# Patient Record
Sex: FEMALE | Race: WHITE | NOT HISPANIC OR LATINO | Employment: UNEMPLOYED | URBAN - NONMETROPOLITAN AREA
[De-identification: names, ages, dates, MRNs, and addresses within clinical notes are randomized per-mention and may not be internally consistent; named-entity substitution may affect disease eponyms.]

---

## 2018-09-26 ENCOUNTER — OFFICE VISIT CONVERTED (OUTPATIENT)
Dept: FAMILY MEDICINE CLINIC | Age: 23
End: 2018-09-26
Attending: NURSE PRACTITIONER

## 2020-03-23 ENCOUNTER — OFFICE VISIT CONVERTED (OUTPATIENT)
Dept: FAMILY MEDICINE CLINIC | Age: 25
End: 2020-03-23
Attending: FAMILY MEDICINE

## 2020-03-23 ENCOUNTER — HOSPITAL ENCOUNTER (OUTPATIENT)
Dept: OTHER | Facility: HOSPITAL | Age: 25
Discharge: HOME | End: 2020-03-23
Attending: FAMILY MEDICINE

## 2020-03-25 LAB — BACTERIA SPEC AEROBE CULT: NORMAL

## 2021-03-09 ENCOUNTER — HOSPITAL ENCOUNTER (OUTPATIENT)
Dept: OTHER | Facility: HOSPITAL | Age: 26
Discharge: HOME | End: 2021-03-09
Attending: NURSE PRACTITIONER

## 2021-03-09 ENCOUNTER — OFFICE VISIT CONVERTED (OUTPATIENT)
Dept: FAMILY MEDICINE CLINIC | Age: 26
End: 2021-03-09
Attending: NURSE PRACTITIONER

## 2021-05-18 NOTE — PROGRESS NOTES
Sylwia Toney 1995     Office/Outpatient Visit    Visit Date: Wed, Sep 26, 2018 03:20 pm    Provider: Peyton Zambrano N.P. (Assistant: Roxane Luna MA)    Location: [facility]        Electronically signed by Peyton Zambrano N.P. on  09/26/2018 03:44:28 PM                             SUBJECTIVE:        CC:     Yahaira is a 23 year old White female.  patient presents today with complaints of not being able to focus in school.;         HPI: Yahaira presents with c/o trouble focusing in school. She is going to [employer] for [employer] and working at [employer] as well. She notes that she has failed 3 of her classes. Not getting much sleep but denies trouble sleeping. She has history of ADD. Was previously on Concerta and then Focalin. Has not taken for about 7 years.     ROS:     CONSTITUTIONAL:  Negative for chills and fever.      CARDIOVASCULAR:  Negative for chest pain and palpitations.      RESPIRATORY:  Negative for dyspnea and frequent wheezing.      GASTROINTESTINAL:  Negative for abdominal pain and vomiting.      NEUROLOGICAL:  Negative for dizziness and headaches.      ENDOCRINE:  Negative for polydipsia and polyphagia.      PSYCHIATRIC:  Positive for difficulty concentrating.   Negative for suicidal thoughts.          PMH/FMH/SH:     Last Reviewed on 10/11/2017 03:33 PM by Peyton Zambrano    Past Medical History:                 PAST MEDICAL HISTORY         Chicken pox         GYNECOLOGICAL HISTORY:    G1 miscarriage 1--sept 2013    Menarche occurred at age 11.  Hospitalizations: ([facility] 2/2011)         PREVENTIVE HEALTH MAINTENANCE             PAP SMEAR: was last done 2016 with normal results [facility] [place]         Surgical History:         Breast Augmentation - 2013.       Pressure Equalization Tubes: at age 1;         Family History:         Mother: Diabetes         Social History:     Occupation: [employer]      Marital Status: Single         Tobacco/Alcohol/Supplements:     Last  Reviewed on 10/11/2017 03:33 PM by Pyeton Zambrano    Tobacco: Current Smoker: Currently smokes cigarettes some days.          Alcohol: Frequency:    on rare occasion;         Substance Abuse History:     Last Reviewed on 10/11/2017 03:33 PM by Peyton Zambrano        has tried lsd, mushrooms, ectasy,marijuana         Mental Health History:     Last Reviewed on 10/11/2017 03:33 PM by Peyton Zambrano        Communicable Diseases (eg STDs):     Last Reviewed on 10/11/2017 03:33 PM by Peyton Zambrano            Current Problems:     Last Reviewed on 10/11/2017 03:33 PM by Peyton Zambrano    Constipation     Ovarian cyst     Acne     Chondromalacia patellae     ADD     Diffuse arthralgia     Depression     Other drug abuse, episodic         Immunizations:     DTaP 1993     DTaP 3/2/1994     DTaP 6/21/1994     DTaP 1/13/1995     DTaP 9/19/1997     Td adult 9/20/2004     HIB-Mency 1993     HIB-Mency 3/2/1994     HIB-Mency 6/21/1994     HIB-Mency 1/13/1995     Hep B (pedi/adol, 3-dose schedule) 1993     Hep B (pedi/adol, 3-dose schedule) 1993     Hep B (pedi/adol, 3-dose schedule) 10/28/1994     Menveo 2/22/2012     Pfizer MenB vs. Havrix 5/3/2013     Pfizer MenB vs. Havrix 7/17/2013     Positive Hx. of Chicken Pox 5/7/2007     zzGardasil 9/28/2007     zzGardasil 12/12/2007     zzGardasil 3/14/2008     OPV  Poliovirus, live (oral) 1993     OPV  Poliovirus, live (oral) 3/2/1994     OPV  Poliovirus, live (oral) 6/21/1994     OPV  Poliovirus, live (oral) 9/19/1997     MMR  (Measles-Mumps-Rubella), live 1/13/1995     MMR  (Measles-Mumps-Rubella), live 9/19/1997     FluMist 8/31/2009     FluMist 9/28/2010     FluMist 11/4/2011     FluMist 1/11/2013     Menactra (Meningococcal MCV4P) 8/21/2007     Tdap (Tetanus, reduced diph, acellular pertussis) 12/10/2010         Allergies:     Last Reviewed on 10/11/2017 03:33 PM by Peyton Zambrano      No Known Drug Allergies.         Current Medications:     Last Reviewed on  10/11/2017 03:33 PM by Peyton Zambrano    None        OBJECTIVE:        Vitals:         Current: 9/26/2018 3:24:57 PM    Ht:  5 ft, 5 in;  Wt: 150.2 lbs;  BMI: 25.0    T: 97.9 F (temporal);  BP: 120/89 mm Hg (right arm, sitting);  P: 75 bpm (right arm (BP Cuff), sitting)        Exams:     PHYSICAL EXAM:     GENERAL: well developed, well nourished;  no apparent distress;     RESPIRATORY: normal respiratory rate and pattern with no distress; normal breath sounds with no rales, rhonchi, wheezes or rubs;     CARDIOVASCULAR: normal rate; rhythm is regular;     GASTROINTESTINAL: nontender; normal bowel sounds; no organomegaly;     MUSCULOSKELETAL: normal gait;     NEUROLOGIC: mental status: alert and oriented x 3; GROSSLY INTACT     PSYCHIATRIC: appropriate affect and demeanor; normal psychomotor function; normal speech pattern; normal thought and perception;         ASSESSMENT           799.51   R41.840  Attention or concentration deficit              DDx:         ORDERS:         Procedures Ordered:       REFER  Referral to Specialist or Other Facility  (Send-Out)                   PLAN:          Attention or concentration deficit         REFERRALS[facility]  [facility]  [facility] of concentration deficit with history of ADD.     FOLLOW-UP: Schedule follow-up appointments on a p.r.n. basis. for Annual Checkup           Orders:       REFER  Referral to Specialist or Other Facility  (Send-Out)               Patient Recommendations:        For  Attention or concentration deficit:     I also recommend [facility].  Schedule follow-up appointments as needed.              CHARGE CAPTURE           **Please note: ICD descriptions below are intended for billing purposes only and may not represent clinical diagnoses**        Primary Diagnosis:         799.51 Attention or concentration deficit            R41.840    Attention and concentration deficit              Orders:          99676   Office/outpatient visit; established  patient, level 3  (In-House)

## 2021-05-18 NOTE — PROGRESS NOTES
Feng Sylwia Paige  1995     Office/Outpatient Visit    Visit Date: Mon, Mar 23, 2020 02:38 pm    Provider: Nilay Knapp MD (Assistant: Spurling, Sarah C, MA)    Location: [facility]        Electronically signed by Nilay Knapp MD on  03/23/2020 06:27:03 PM                             Subjective:        CC: Yahaira is a 24 year old White female.  This is her first visit to the clinic.  sore throat;         HPI:           Patient complains of acute upper respiratory infection, unspecified.  These have been present for the past 4 days.  The symptoms include nasal congestion, sinus pain/pressure and sore throat.  Lots of body aches and really, really tired all the time.  No cough. Has not checked temp, and wasn't for sure if had fever or not - but body aches.She denies exposure to ill contacts.  Works for [employer] from home.      Has hx of smoking and just quit last week, so I commended her on that decision.  Counseled her on methods that can be helpful.  Encouraged early reward.     ROS:     CONSTITUTIONAL:  Negative for chills, fatigue, fever, and weight change.      EYES:  Negative for blurred vision.      CARDIOVASCULAR:  Negative for chest pain, orthopnea, paroxysmal nocturnal dyspnea and pedal edema.      RESPIRATORY:  Negative for dyspnea.      GASTROINTESTINAL:  Negative for abdominal pain, constipation, diarrhea, nausea and vomiting.      GENITOURINARY:  Negative for dysuria and frequent urination.      NEUROLOGICAL:  Negative for dizziness, headaches, paresthesias, and weakness.      PSYCHIATRIC:  Negative for anxiety, depression, and sleep disturbances.          Past Medical History / Family History / Social History:         Last Reviewed on 10/11/2017 03:33 PM by Peyton Zambrano    Past Medical History:                 PAST MEDICAL HISTORY         Chicken pox         GYNECOLOGICAL HISTORY:    G1 miscarriage 1--sept 2013    Menarche occurred at age 11.  [facility]         PREVENTIVE HEALTH MAINTENANCE             PAP SMEAR: was last done 10/2017 with normal results [facility]         Surgical History:         Breast Augmentation - 2013.      Pressure Equalization Tubes: at age 1;         Family History:         Mother: Diabetes         Social History:     Occupation: [employer]     Marital Status: Single         Tobacco/Alcohol/Supplements:     Last Reviewed on 9/26/2018 03:22 PM by Roxane Luna    Tobacco: Current Smoker: Currently smokes cigarettes some days.          Alcohol: Frequency:    on rare occasion;         Substance Abuse History:     Last Reviewed on 10/11/2017 03:33 PM by Peyton Zambrano        has tried lsd, mushrooms, ectasy,marijuana         Mental Health History:     Last Reviewed on 10/11/2017 03:33 PM by Peyton Zambrano        Communicable Diseases (eg STDs):     Last Reviewed on 10/11/2017 03:33 PM by Peyton Zambrano        Allergies:     Last Reviewed on 9/26/2018 03:22 PM by Roxane Luna    No Known Allergies.        Current Medications:     Last Reviewed on 3/23/2020 02:38 PM by Spurling, Sarah C    Ponstel 250mg Capsules [PRN]        Objective:        Vitals:         Current: 3/23/2020 2:41:08 PM    Ht:  5 ft, 5 in;  Wt: 164.8 lbs;  BMI: 27.4T: 98.1 F (temporal);  BP: 124/81 mm Hg (left arm, sitting);  P: 92 bpm (left arm (BP Cuff), sitting)O2 Sat: 99 % (room air)        Exams:     PHYSICAL EXAM:     GENERAL: well developed, well nourished;  well groomed;  no apparent distress;     EYES: nonicteric;     E/N/T: EARS:  normal external auditory canals and tympanic membranes;  grossly normal hearing; OROPHARYNX:  normal mucosa, dentition, gingiva, and posterior pharynx;     NECK: thyroid exam reveals no discrete nodules;  carotid exam reveals no bruits;     RESPIRATORY: normal appearance and symmetric expansion of chest wall; normal respiratory rate and pattern with no distress; normal breath sounds with no rales, rhonchi, wheezes or rubs;      CARDIOVASCULAR: normal rate; rhythm is regular;  no systolic murmur;     LYMPHATIC: no enlargement of cervical or facial nodes; no supraclavicular nodes;     NEUROLOGIC: GROSSLY INTACT     PSYCHIATRIC:  appropriate affect and demeanor; normal speech pattern; grossly normal memory;         Lab/Test Results:         Rapid Strep Screen: Negative (03/23/2020),     Performed by:: toyin (03/23/2020),             Assessment:         J06.9   Acute upper respiratory infection, unspecified       F17.210   Nicotine dependence, cigarettes, uncomplicated           ORDERS:         Lab Orders:       [id number]  Group A Streptococcus detection by immunoassay with direct optical observation  (In-House)            46702  [facility] Throat culture, strep  (Send-Out)              Other Orders:         Smoking and Tobacco Cessation 3 to 10 minutes  (In-House)                      Plan:         Acute upper respiratory infection, unspecifiedTreat symptomatically.  Call if symptoms worsen.  If severe soa go to er.           Orders:       [id number]  Group A Streptococcus detection by immunoassay with direct optical observation  (In-House)            52741  [facility] Throat culture, strep  (Send-Out)              Nicotine dependence, cigarettes, uncomplicatedcounseled x 4 min          Orders:         Smoking and Tobacco Cessation 3 to 10 minutes  (In-House)                  Charge Capture:         Primary Diagnosis:     J06.9  Acute upper respiratory infection, unspecified           Orders:      50322  Office/outpatient visit; established patient, level 3  (In-House)            28803  Group A Streptococcus detection by immunoassay with direct optical observation  (In-House)              F17.210  Nicotine dependence, cigarettes, uncomplicated           Orders:        Smoking and Tobacco Cessation 3 to 10 minutes  (In-House)

## 2021-05-18 NOTE — PROGRESS NOTES
Toney Sylwiajono Syed  1995     Office/Outpatient Visit    Visit Date: Tue, Mar 9, 2021 03:54 pm    Provider: Virginia Fermin N.P. (Assistant: Amelia Kaplan MA)    Location: [facility]        Electronically signed by Virginia Fermin N.P. on  03/11/2021 12:54:51 PM                             Subjective:        CC: Yahaira is a 25 year old White female.  right/left hip pain;         HPI: lmp last week          Yahaira presents with pain in unspecified hip.      Yahaira complains of bilateral hip pain.  The location of the pain is superficial and lateral.  It does not radiate.  She describes it as mild, constant, and burning.  The initial onset of pain was more than 6 months ago.  There was no apparent precipitating event or injury.  She has not found anything that relieves the pain.  Nothing in particular aggravates the pain.  She denies recent symptoms of fever, chills, a rash.  Pertinent medical history is unremarkable.            Dysmenorrhea, unspecified details; stable with prn use of pnstel.  requests refills.  denies side effects.      ROS:     CONSTITUTIONAL:  Negative for chills, fatigue, fever, and weight change.      CARDIOVASCULAR:  Negative for chest pain, palpitations, tachycardia, orthopnea, and edema.      RESPIRATORY:  Negative for cough, dyspnea, and hemoptysis.      GASTROINTESTINAL:  Negative for abdominal pain, heartburn, constipation, diarrhea, and stool changes.      GENITOURINARY:  Positive for dysmenorrhea (stable).   Negative for irregular menstrual cycle.      MUSCULOSKELETAL:  Positive for arthralgias (bilateral hips).      NEUROLOGICAL:  Negative for dizziness, headaches, paresthesias, and weakness.      PSYCHIATRIC:  Negative for anxiety, depression, and sleep disturbances.          Past Medical History / Family History / Social History:         Last Reviewed on 3/09/2021 04:13 PM by Virginia Fermin    Past Medical History:                 PAST MEDICAL HISTORY          Chicken pox         GYNECOLOGICAL HISTORY:    G1 miscarriage 1--sept 2013    Menarche occurred at age 11.  Hospitalizations: ([facility])         PREVENTIVE HEALTH MAINTENANCE             COLORECTAL CANCER SCREENING: Up to date (colonoscopy q10y; sigmoidoscopy q5y; Cologuard q3y) was last done 7/15/2020, Results are in chart; colonoscopy with the following abnormalities noted-- hemorrhoids     PAP SMEAR: was last done 2020 with normal results [facility]         Surgical History:         Breast Augmentation - 2013.      Pressure Equalization Tubes: at age 1;         Family History:         Mother: Diabetes         Social History:     Occupation: [employer]     Marital Status: Single         Tobacco/Alcohol/Supplements:     Last Reviewed on 3/09/2021 03:56 PM by Amelia Kaplan    Tobacco: She has a past history of cigarette smoking; quit date:  1 year ago.          Alcohol: Frequency:    on rare occasion;         Substance Abuse History:     Last Reviewed on 10/11/2017 03:33 PM by Peyton Zambrano        has tried lsd, mushrooms, ectasy,marijuana         Mental Health History:     Last Reviewed on 10/11/2017 03:33 PM by Peyton Zambrano        Communicable Diseases (eg STDs):     Last Reviewed on 10/11/2017 03:33 PM by Peyton Zambrano        Current Problems:     Last Reviewed on 3/09/2021 04:13 PM by Virginia Fermin    Attention and concentration deficit    Nicotine dependence, cigarettes, uncomplicated    Pain in unspecified hip    Dysmenorrhea, unspecified        Immunizations:     DTaP 1993    DTaP 3/2/1994    DTaP 6/21/1994    DTaP 1/13/1995    DTaP 9/19/1997    Td adult 9/20/2004    HIB-Mency 1993    HIB-Mency 3/2/1994    HIB-Mency 6/21/1994    HIB-Mency 1/13/1995    Hep B (pedi/adol, 3-dose schedule) 1993    Hep B (pedi/adol, 3-dose schedule) 1993    Hep B (pedi/adol, 3-dose schedule) 10/28/1994    Menveo 2/22/2012    Pfizer MenB vs. Havrix 5/3/2013    Pfizer MenB vs. Havrix  7/17/2013    Positive Hx. of Chicken Pox 5/7/2007    zzGardasil 9/28/2007    zzGardasil 12/12/2007    zzGardasil 3/14/2008    OPV  Poliovirus, live (oral) 1993    OPV  Poliovirus, live (oral) 3/2/1994    OPV  Poliovirus, live (oral) 6/21/1994    OPV  Poliovirus, live (oral) 9/19/1997    MMR  (Measles-Mumps-Rubella), live 1/13/1995    MMR  (Measles-Mumps-Rubella), live 9/19/1997    FluMist 8/31/2009    FluMist 9/28/2010    FluMist 11/4/2011    FluMist 1/11/2013    Menactra (Meningococcal MCV4P) 8/21/2007    Tdap (Tetanus, reduced diph, acellular pertussis) 12/10/2010        Allergies:     Last Reviewed on 3/09/2021 03:57 PM by Amelia Kaplan    No Known Allergies.        Current Medications:     Last Reviewed on 3/09/2021 03:57 PM by Amelia Kaplan    Ponstel 250mg Capsules [PRN]    ADAPAL/ALECIA P GEL 0.1-2.5% Grams [APPLY SMALL PEA-SIZED AMOUNT TO FACE EACH NIGHT AT BEDTIME FOR ACNE.]    SPIRONOLACT 50MG Tablets [TAKE 1 TABLET BY MOUTH EVERY DAY]        Objective:        Vitals:         Current: 3/9/2021 3:59:13 PM    Ht:  5 ft, 5 in;  Wt: 174 lbs;  BMI: 29.0T: 97.3 F (temporal);  BP: 121/81 mm Hg (left arm, sitting);  P: 98 bpm (left arm (BP Cuff), sitting)        Exams:     PHYSICAL EXAM:     GENERAL: no apparent distress;     RESPIRATORY: normal respiratory rate and pattern with no distress; normal breath sounds with no rales, rhonchi, wheezes or rubs;     CARDIOVASCULAR: normal rate; rhythm is regular;     MUSCULOSKELETAL:  Normal range of motion, strength and tone;     NEUROLOGIC: mental status: alert and oriented x 3; GROSSLY INTACT     PSYCHIATRIC:  appropriate affect and demeanor; normal speech pattern; grossly normal memory;         Assessment:         M25.559   Pain in unspecified hip       N94.6   Dysmenorrhea, unspecified           ORDERS:         Meds Prescribed:       [Refilled] Ponstel 250 mg oral capsule [tid prn for up to 7 days take with food], #30 (thirty) capsules, Refills: 1 (one)          Radiology/Test Orders:       [id number]  Bilateral hip x-ray, minimum of two views of each hip, including anteroposterior view of pelvis  (Send-Out)                      Plan:         Pain in unspecified hip        RADIOLOGY:  I have ordered a bilateral hip x-ray to be done today.  MIPS Vaccines Flu and Pneumonia updated in Shot record     RECOMMENDATIONS given include: RICE therapy and she denies any other joint pain.  consider PT, arthritis profile, or referral to ortho if symptoms persist.  xray pending.  exam is normal today..            Orders:       [id number]  Bilateral hip x-ray, minimum of two views of each hip, including anteroposterior view of pelvis  (Send-Out)              Dysmenorrhea, unspecified          Prescriptions:       [Refilled] Ponstel 250 mg oral capsule [tid prn for up to 7 days take with food], #30 (thirty) capsules, Refills: 1 (one)             Patient Recommendations:        For  Pain in unspecified hip:    Rest the affected area and keep it elevated as much as possible. Apply ice over the affected area. Use a compression wrap, such as an Ace bandage.              Charge Capture:         Primary Diagnosis:     M25.559  Pain in unspecified hip           Orders:      [id number]  Office/outpatient visit; established patient, level 3  (In-House)              N94.6  Dysmenorrhea, unspecified

## 2021-07-01 VITALS
WEIGHT: 150.2 LBS | BODY MASS INDEX: 25.02 KG/M2 | TEMPERATURE: 97.9 F | HEIGHT: 65 IN | HEART RATE: 75 BPM | DIASTOLIC BLOOD PRESSURE: 89 MMHG | SYSTOLIC BLOOD PRESSURE: 120 MMHG

## 2021-07-02 VITALS
BODY MASS INDEX: 28.99 KG/M2 | HEIGHT: 65 IN | TEMPERATURE: 97.3 F | DIASTOLIC BLOOD PRESSURE: 81 MMHG | SYSTOLIC BLOOD PRESSURE: 121 MMHG | HEART RATE: 98 BPM | WEIGHT: 174 LBS

## 2021-07-02 VITALS
TEMPERATURE: 98.1 F | DIASTOLIC BLOOD PRESSURE: 81 MMHG | HEIGHT: 65 IN | HEART RATE: 92 BPM | OXYGEN SATURATION: 99 % | BODY MASS INDEX: 27.46 KG/M2 | WEIGHT: 164.8 LBS | SYSTOLIC BLOOD PRESSURE: 124 MMHG

## 2022-06-22 ENCOUNTER — PRE-ADMISSION TESTING (OUTPATIENT)
Dept: PREADMISSION TESTING | Facility: HOSPITAL | Age: 27
End: 2022-06-22

## 2022-06-22 LAB
ANION GAP SERPL CALCULATED.3IONS-SCNC: 11 MMOL/L (ref 5–15)
BASOPHILS # BLD AUTO: 0.07 10*3/MM3 (ref 0–0.2)
BASOPHILS NFR BLD AUTO: 0.6 % (ref 0–1.5)
BUN SERPL-MCNC: 11 MG/DL (ref 6–20)
BUN/CREAT SERPL: 15.7 (ref 7–25)
CALCIUM SPEC-SCNC: 9.4 MG/DL (ref 8.6–10.5)
CHLORIDE SERPL-SCNC: 101 MMOL/L (ref 98–107)
CO2 SERPL-SCNC: 25 MMOL/L (ref 22–29)
CREAT SERPL-MCNC: 0.7 MG/DL (ref 0.57–1)
DEPRECATED RDW RBC AUTO: 39.3 FL (ref 37–54)
EGFRCR SERPLBLD CKD-EPI 2021: 121 ML/MIN/1.73
EOSINOPHIL # BLD AUTO: 0.68 10*3/MM3 (ref 0–0.4)
EOSINOPHIL NFR BLD AUTO: 5.5 % (ref 0.3–6.2)
ERYTHROCYTE [DISTWIDTH] IN BLOOD BY AUTOMATED COUNT: 11.9 % (ref 12.3–15.4)
GLUCOSE SERPL-MCNC: 99 MG/DL (ref 65–99)
HCG SERPL QL: NEGATIVE
HCT VFR BLD AUTO: 38.6 % (ref 34–46.6)
HGB BLD-MCNC: 13.5 G/DL (ref 12–15.9)
IMM GRANULOCYTES # BLD AUTO: 0.03 10*3/MM3 (ref 0–0.05)
IMM GRANULOCYTES NFR BLD AUTO: 0.2 % (ref 0–0.5)
LYMPHOCYTES # BLD AUTO: 2.8 10*3/MM3 (ref 0.7–3.1)
LYMPHOCYTES NFR BLD AUTO: 22.5 % (ref 19.6–45.3)
MCH RBC QN AUTO: 32.3 PG (ref 26.6–33)
MCHC RBC AUTO-ENTMCNC: 35 G/DL (ref 31.5–35.7)
MCV RBC AUTO: 92.3 FL (ref 79–97)
MONOCYTES # BLD AUTO: 0.77 10*3/MM3 (ref 0.1–0.9)
MONOCYTES NFR BLD AUTO: 6.2 % (ref 5–12)
NEUTROPHILS NFR BLD AUTO: 65 % (ref 42.7–76)
NEUTROPHILS NFR BLD AUTO: 8.12 10*3/MM3 (ref 1.7–7)
NRBC BLD AUTO-RTO: 0 /100 WBC (ref 0–0.2)
PLATELET # BLD AUTO: 267 10*3/MM3 (ref 140–450)
PMV BLD AUTO: 10.8 FL (ref 6–12)
POTASSIUM SERPL-SCNC: 3.9 MMOL/L (ref 3.5–5.2)
RBC # BLD AUTO: 4.18 10*6/MM3 (ref 3.77–5.28)
SODIUM SERPL-SCNC: 137 MMOL/L (ref 136–145)
WBC NRBC COR # BLD: 12.47 10*3/MM3 (ref 3.4–10.8)

## 2022-06-22 PROCEDURE — 85025 COMPLETE CBC W/AUTO DIFF WBC: CPT

## 2022-06-22 PROCEDURE — 80048 BASIC METABOLIC PNL TOTAL CA: CPT

## 2022-06-22 PROCEDURE — 36415 COLL VENOUS BLD VENIPUNCTURE: CPT

## 2022-06-22 PROCEDURE — 84703 CHORIONIC GONADOTROPIN ASSAY: CPT

## 2022-06-22 RX ORDER — MEFENAMIC ACID 250 MG/1
2 CAPSULE ORAL TAKE AS DIRECTED
COMMUNITY

## 2022-06-22 NOTE — DISCHARGE INSTRUCTIONS
Take the following medications the morning of surgery:      NONE    ARRIVE AT 11:30    If you are on prescription narcotic pain medication to control your pain you may also take that medication the morning of surgery.    General Instructions:  Do not eat solid food after midnight the night before surgery.  You may drink clear liquids day of surgery but must stop at least one hour before your hospital arrival time.  It is beneficial for you to have a clear drink that contains carbohydrates the day of surgery.  We suggest a 12 to 20 ounce bottle of Gatorade or Powerade for non-diabetic patients or a 12 to 20 ounce bottle of G2 or Powerade Zero for diabetic patients. (Pediatric patients, are not advised to drink a 12 to 20 ounce carbohydrate drink)    Clear liquids are liquids you can see through.  Nothing red in color.     Plain water                               Sports drinks  Sodas                                   Gelatin (Jell-O)  Fruit juices without pulp such as white grape juice and apple juice  Popsicles that contain no fruit or yogurt  Tea or coffee (no cream or milk added)  Gatorade / Powerade  G2 / Powerade Zero    Patients who avoid smoking, chewing tobacco and alcohol for 4 weeks prior to surgery have a reduced risk of post-operative complications.  Quit smoking as many days before surgery as you can.  Do not smoke, use chewing tobacco or drink alcohol the day of surgery.   If applicable bring your C-PAP/ BI-PAP machine.  Bring any papers given to you in the doctor’s office.  Wear clean comfortable clothes.  Do not wear contact lenses, false eyelashes or make-up.  Bring a case for your glasses.   Bring crutches or walker if applicable.  Remove all piercings.  Leave jewelry and any other valuables at home.  Hair extensions with metal clips must be removed prior to surgery.  The Pre-Admission Testing nurse will instruct you to bring medications if unable to obtain an accurate list in Pre-Admission Testing.           Preventing a Surgical Site Infection:  For 2 to 3 days before surgery, avoid shaving with a razor because the razor can irritate skin and make it easier to develop an infection.    Any areas of open skin can increase the risk of a post-operative wound infection by allowing bacteria to enter and travel throughout the body.  Notify your surgeon if you have any skin wounds / rashes even if it is not near the expected surgical site.  The area will need assessed to determine if surgery should be delayed until it is healed.  The night prior to surgery shower using a fresh bar of anti-bacterial soap (such as Dial) and clean washcloth.  Sleep in a clean bed with clean clothing.  Do not allow pets to sleep with you.  Shower on the morning of surgery using a fresh bar of anti-bacterial soap (such as Dial) and clean washcloth.  Dry with a clean towel and dress in clean clothing.  Ask your surgeon if you will be receiving antibiotics prior to surgery.  Make sure you, your family, and all healthcare providers clean their hands with soap and water or an alcohol based hand  before caring for you or your wound.    Day of surgery:  Your arrival time is approximately two hours before your scheduled surgery time.  Upon arrival, a Pre-op nurse and Anesthesiologist will review your health history, obtain vital signs, and answer questions you may have.  The only belongings needed at this time will be a list of your home medications and if applicable your C-PAP/BI-PAP machine.  A Pre-op nurse will start an IV and you may receive medication in preparation for surgery, including something to help you relax.     Please be aware that surgery does come with discomfort.  We want to make every effort to control your discomfort so please discuss any uncontrolled symptoms with your nurse.   Your doctor will most likely have prescribed pain medications.      If you are going home after surgery you will receive individualized  written care instructions before being discharged.  A responsible adult must drive you to and from the hospital on the day of your surgery and stay with you for 24 hours.  Discharge prescriptions can be filled by the hospital pharmacy during regular pharmacy hours.  If you are having surgery late in the day/evening your prescription may be e-prescribed to your pharmacy.  Please verify your pharmacy hours or chose a 24 hour pharmacy to avoid not having access to your prescription because your pharmacy has closed for the day.    If you are staying overnight following surgery, you will be transported to your hospital room following the recovery period.  [facility] has all private rooms.    If you have any questions please call Pre-Admission Testing at [phone].  Deductibles and co-payments are collected on the day of service. Please be prepared to pay the required co-pay, deductible or deposit on the day of service as defined by your plan.    Patient Education for Self-Quarantine Process    Following your COVID testing, we strongly recommend that you wear a mask when you are with other people and practice social distancing.   Limit your activities to only required outings.  Wash your hands with soap and water frequently for at least 20 seconds.   Avoid touching your eyes, nose and mouth with unwashed hands.  Do not share anything - utensils, drinking glasses, food from the same bowl.   Sanitize household surfaces daily. Include all high touch areas (door handles, light switches, phones, countertops, etc.)    Call your surgeon immediately if you experience any of the following symptoms:  Sore Throat  Shortness of Breath or difficulty breathing  Cough  Chills  Body soreness or muscle pain  Headache  Fever  New loss of taste or smell  Do not arrive for your surgery ill.  Your procedure will need to be rescheduled to another time.  You will need to call your physician before the day of surgery to avoid  any unnecessary exposure to hospital staff as well as other patients.

## 2022-06-25 ENCOUNTER — LAB (OUTPATIENT)
Dept: LAB | Facility: HOSPITAL | Age: 27
End: 2022-06-25

## 2022-06-25 LAB — SARS-COV-2 ORF1AB RESP QL NAA+PROBE: NOT DETECTED

## 2022-06-25 PROCEDURE — C9803 HOPD COVID-19 SPEC COLLECT: HCPCS

## 2022-06-25 PROCEDURE — U0004 COV-19 TEST NON-CDC HGH THRU: HCPCS

## 2022-06-28 ENCOUNTER — ANESTHESIA EVENT (OUTPATIENT)
Dept: PERIOP | Facility: HOSPITAL | Age: 27
End: 2022-06-28

## 2022-06-28 ENCOUNTER — HOSPITAL ENCOUNTER (OUTPATIENT)
Facility: HOSPITAL | Age: 27
Setting detail: HOSPITAL OUTPATIENT SURGERY
Discharge: HOME | End: 2022-06-28
Attending: OBSTETRICS & GYNECOLOGY | Admitting: OBSTETRICS & GYNECOLOGY

## 2022-06-28 ENCOUNTER — ANESTHESIA (OUTPATIENT)
Dept: PERIOP | Facility: HOSPITAL | Age: 27
End: 2022-06-28

## 2022-06-28 VITALS
BODY MASS INDEX: 31.85 KG/M2 | OXYGEN SATURATION: 97 % | SYSTOLIC BLOOD PRESSURE: 124 MMHG | TEMPERATURE: 97.9 F | HEART RATE: 76 BPM | RESPIRATION RATE: 16 BRPM | DIASTOLIC BLOOD PRESSURE: 91 MMHG | HEIGHT: 65 IN | WEIGHT: 191.14 LBS

## 2022-06-28 DIAGNOSIS — G89.18 POSTOPERATIVE PAIN: Primary | ICD-10-CM

## 2022-06-28 DIAGNOSIS — N80.9 ENDOMETRIOSIS: ICD-10-CM

## 2022-06-28 LAB
B-HCG UR QL: NEGATIVE
EXPIRATION DATE: NORMAL
INTERNAL NEGATIVE CONTROL: NORMAL
INTERNAL POSITIVE CONTROL: NORMAL
Lab: NORMAL

## 2022-06-28 PROCEDURE — 88305 TISSUE EXAM BY PATHOLOGIST: CPT | Performed by: OBSTETRICS & GYNECOLOGY

## 2022-06-28 PROCEDURE — 25010000002 KETOROLAC TROMETHAMINE PER 15 MG: Performed by: ANESTHESIOLOGY

## 2022-06-28 PROCEDURE — 25010000002 FENTANYL CITRATE (PF) 50 MCG/ML SOLUTION: Performed by: ANESTHESIOLOGY

## 2022-06-28 PROCEDURE — 25010000002 HYDROMORPHONE PER 4 MG: Performed by: ANESTHESIOLOGY

## 2022-06-28 PROCEDURE — 81025 URINE PREGNANCY TEST: CPT | Performed by: ANESTHESIOLOGY

## 2022-06-28 PROCEDURE — 25010000002 ONDANSETRON PER 1 MG: Performed by: ANESTHESIOLOGY

## 2022-06-28 PROCEDURE — 25010000002 PROPOFOL 10 MG/ML EMULSION: Performed by: ANESTHESIOLOGY

## 2022-06-28 PROCEDURE — 25010000002 CEFAZOLIN IN DEXTROSE 2-4 GM/100ML-% SOLUTION: Performed by: STUDENT IN AN ORGANIZED HEALTH CARE EDUCATION/TRAINING PROGRAM

## 2022-06-28 DEVICE — HEMOST ABS SURGICEL SNOW 1X2IN: Type: IMPLANTABLE DEVICE | Site: ABDOMEN | Status: FUNCTIONAL

## 2022-06-28 RX ORDER — PROMETHAZINE HYDROCHLORIDE 25 MG/1
25 SUPPOSITORY RECTAL ONCE AS NEEDED
Status: DISCONTINUED | OUTPATIENT
Start: 2022-06-28 | End: 2022-06-28 | Stop reason: HOSPADM

## 2022-06-28 RX ORDER — METOCLOPRAMIDE HYDROCHLORIDE 5 MG/ML
10 INJECTION INTRAMUSCULAR; INTRAVENOUS EVERY 6 HOURS PRN
Status: CANCELLED | OUTPATIENT
Start: 2022-06-28

## 2022-06-28 RX ORDER — EPHEDRINE SULFATE 50 MG/ML
5 INJECTION, SOLUTION INTRAVENOUS ONCE AS NEEDED
Status: DISCONTINUED | OUTPATIENT
Start: 2022-06-28 | End: 2022-06-28 | Stop reason: HOSPADM

## 2022-06-28 RX ORDER — PROPOFOL 10 MG/ML
VIAL (ML) INTRAVENOUS AS NEEDED
Status: DISCONTINUED | OUTPATIENT
Start: 2022-06-28 | End: 2022-06-28 | Stop reason: SURG

## 2022-06-28 RX ORDER — DIPHENHYDRAMINE HCL 25 MG
25 CAPSULE ORAL
Status: DISCONTINUED | OUTPATIENT
Start: 2022-06-28 | End: 2022-06-28 | Stop reason: HOSPADM

## 2022-06-28 RX ORDER — IBUPROFEN 800 MG/1
800 TABLET ORAL EVERY 8 HOURS PRN
Qty: 30 TABLET | Refills: 0 | Status: SHIPPED | OUTPATIENT
Start: 2022-06-28 | End: 2023-06-28

## 2022-06-28 RX ORDER — ONDANSETRON 2 MG/ML
INJECTION INTRAMUSCULAR; INTRAVENOUS AS NEEDED
Status: DISCONTINUED | OUTPATIENT
Start: 2022-06-28 | End: 2022-06-28 | Stop reason: SURG

## 2022-06-28 RX ORDER — ROCURONIUM BROMIDE 10 MG/ML
INJECTION, SOLUTION INTRAVENOUS AS NEEDED
Status: DISCONTINUED | OUTPATIENT
Start: 2022-06-28 | End: 2022-06-28 | Stop reason: SURG

## 2022-06-28 RX ORDER — FENTANYL CITRATE 50 UG/ML
50 INJECTION, SOLUTION INTRAMUSCULAR; INTRAVENOUS
Status: DISCONTINUED | OUTPATIENT
Start: 2022-06-28 | End: 2022-06-28 | Stop reason: HOSPADM

## 2022-06-28 RX ORDER — SCOLOPAMINE TRANSDERMAL SYSTEM 1 MG/1
1 PATCH, EXTENDED RELEASE TRANSDERMAL ONCE
Status: DISCONTINUED | OUTPATIENT
Start: 2022-06-28 | End: 2022-06-28 | Stop reason: HOSPADM

## 2022-06-28 RX ORDER — LIDOCAINE HYDROCHLORIDE 10 MG/ML
0.5 INJECTION, SOLUTION EPIDURAL; INFILTRATION; INTRACAUDAL; PERINEURAL ONCE AS NEEDED
Status: DISCONTINUED | OUTPATIENT
Start: 2022-06-28 | End: 2022-06-28 | Stop reason: HOSPADM

## 2022-06-28 RX ORDER — HYDRALAZINE HYDROCHLORIDE 20 MG/ML
5 INJECTION INTRAMUSCULAR; INTRAVENOUS
Status: DISCONTINUED | OUTPATIENT
Start: 2022-06-28 | End: 2022-06-28 | Stop reason: HOSPADM

## 2022-06-28 RX ORDER — LIDOCAINE HYDROCHLORIDE 20 MG/ML
INJECTION, SOLUTION INFILTRATION; PERINEURAL AS NEEDED
Status: DISCONTINUED | OUTPATIENT
Start: 2022-06-28 | End: 2022-06-28 | Stop reason: SURG

## 2022-06-28 RX ORDER — MAGNESIUM HYDROXIDE 1200 MG/15ML
LIQUID ORAL AS NEEDED
Status: DISCONTINUED | OUTPATIENT
Start: 2022-06-28 | End: 2022-06-28 | Stop reason: HOSPADM

## 2022-06-28 RX ORDER — PROMETHAZINE HYDROCHLORIDE 25 MG/1
12.5 SUPPOSITORY RECTAL EVERY 6 HOURS PRN
Status: CANCELLED | OUTPATIENT
Start: 2022-06-28

## 2022-06-28 RX ORDER — SODIUM CHLORIDE 0.9 % (FLUSH) 0.9 %
3 SYRINGE (ML) INJECTION EVERY 12 HOURS SCHEDULED
Status: DISCONTINUED | OUTPATIENT
Start: 2022-06-28 | End: 2022-06-28 | Stop reason: HOSPADM

## 2022-06-28 RX ORDER — OXYCODONE HYDROCHLORIDE AND ACETAMINOPHEN 5; 325 MG/1; MG/1
1 TABLET ORAL EVERY 4 HOURS PRN
Status: CANCELLED | OUTPATIENT
Start: 2022-06-28 | End: 2022-07-05

## 2022-06-28 RX ORDER — HYDROMORPHONE HYDROCHLORIDE 1 MG/ML
0.5 INJECTION, SOLUTION INTRAMUSCULAR; INTRAVENOUS; SUBCUTANEOUS
Status: DISCONTINUED | OUTPATIENT
Start: 2022-06-28 | End: 2022-06-28 | Stop reason: HOSPADM

## 2022-06-28 RX ORDER — NALOXONE HCL 0.4 MG/ML
0.2 VIAL (ML) INJECTION AS NEEDED
Status: DISCONTINUED | OUTPATIENT
Start: 2022-06-28 | End: 2022-06-28 | Stop reason: HOSPADM

## 2022-06-28 RX ORDER — FLUMAZENIL 0.1 MG/ML
0.2 INJECTION INTRAVENOUS AS NEEDED
Status: DISCONTINUED | OUTPATIENT
Start: 2022-06-28 | End: 2022-06-28 | Stop reason: HOSPADM

## 2022-06-28 RX ORDER — SODIUM CHLORIDE 9 MG/ML
INJECTION, SOLUTION INTRAVENOUS AS NEEDED
Status: DISCONTINUED | OUTPATIENT
Start: 2022-06-28 | End: 2022-06-28 | Stop reason: HOSPADM

## 2022-06-28 RX ORDER — DIPHENHYDRAMINE HYDROCHLORIDE 50 MG/ML
12.5 INJECTION INTRAMUSCULAR; INTRAVENOUS
Status: DISCONTINUED | OUTPATIENT
Start: 2022-06-28 | End: 2022-06-28 | Stop reason: HOSPADM

## 2022-06-28 RX ORDER — HYDROCODONE BITARTRATE AND ACETAMINOPHEN 7.5; 325 MG/1; MG/1
1 TABLET ORAL ONCE AS NEEDED
Status: DISCONTINUED | OUTPATIENT
Start: 2022-06-28 | End: 2022-06-28 | Stop reason: HOSPADM

## 2022-06-28 RX ORDER — ONDANSETRON 2 MG/ML
4 INJECTION INTRAMUSCULAR; INTRAVENOUS EVERY 6 HOURS PRN
Status: CANCELLED | OUTPATIENT
Start: 2022-06-28

## 2022-06-28 RX ORDER — SODIUM CHLORIDE, SODIUM LACTATE, POTASSIUM CHLORIDE, CALCIUM CHLORIDE 600; 310; 30; 20 MG/100ML; MG/100ML; MG/100ML; MG/100ML
9 INJECTION, SOLUTION INTRAVENOUS CONTINUOUS
Status: DISCONTINUED | OUTPATIENT
Start: 2022-06-28 | End: 2022-06-28 | Stop reason: HOSPADM

## 2022-06-28 RX ORDER — KETOROLAC TROMETHAMINE 30 MG/ML
INJECTION, SOLUTION INTRAMUSCULAR; INTRAVENOUS AS NEEDED
Status: DISCONTINUED | OUTPATIENT
Start: 2022-06-28 | End: 2022-06-28 | Stop reason: SURG

## 2022-06-28 RX ORDER — PROMETHAZINE HYDROCHLORIDE 25 MG/1
25 TABLET ORAL ONCE AS NEEDED
Status: DISCONTINUED | OUTPATIENT
Start: 2022-06-28 | End: 2022-06-28 | Stop reason: HOSPADM

## 2022-06-28 RX ORDER — IBUPROFEN 600 MG/1
600 TABLET ORAL ONCE AS NEEDED
Status: COMPLETED | OUTPATIENT
Start: 2022-06-28 | End: 2022-06-28

## 2022-06-28 RX ORDER — DOCUSATE SODIUM 100 MG/1
100 CAPSULE, LIQUID FILLED ORAL 2 TIMES DAILY PRN
Status: CANCELLED | OUTPATIENT
Start: 2022-06-28

## 2022-06-28 RX ORDER — FENTANYL CITRATE 50 UG/ML
INJECTION, SOLUTION INTRAMUSCULAR; INTRAVENOUS AS NEEDED
Status: DISCONTINUED | OUTPATIENT
Start: 2022-06-28 | End: 2022-06-28 | Stop reason: SURG

## 2022-06-28 RX ORDER — ACETAMINOPHEN 500 MG
1000 TABLET ORAL ONCE
Status: COMPLETED | OUTPATIENT
Start: 2022-06-28 | End: 2022-06-28

## 2022-06-28 RX ORDER — SODIUM CHLORIDE 0.9 % (FLUSH) 0.9 %
3-10 SYRINGE (ML) INJECTION AS NEEDED
Status: DISCONTINUED | OUTPATIENT
Start: 2022-06-28 | End: 2022-06-28 | Stop reason: HOSPADM

## 2022-06-28 RX ORDER — OXYCODONE HYDROCHLORIDE AND ACETAMINOPHEN 5; 325 MG/1; MG/1
1 TABLET ORAL EVERY 6 HOURS PRN
Qty: 15 TABLET | Refills: 0 | Status: SHIPPED | OUTPATIENT
Start: 2022-06-28

## 2022-06-28 RX ORDER — FAMOTIDINE 10 MG/ML
20 INJECTION, SOLUTION INTRAVENOUS ONCE
Status: COMPLETED | OUTPATIENT
Start: 2022-06-28 | End: 2022-06-28

## 2022-06-28 RX ORDER — LABETALOL HYDROCHLORIDE 5 MG/ML
5 INJECTION, SOLUTION INTRAVENOUS
Status: DISCONTINUED | OUTPATIENT
Start: 2022-06-28 | End: 2022-06-28 | Stop reason: HOSPADM

## 2022-06-28 RX ORDER — KETOROLAC TROMETHAMINE 15 MG/ML
15 INJECTION, SOLUTION INTRAMUSCULAR; INTRAVENOUS EVERY 6 HOURS
Status: DISCONTINUED | OUTPATIENT
Start: 2022-06-28 | End: 2022-06-28 | Stop reason: HOSPADM

## 2022-06-28 RX ORDER — OXYCODONE AND ACETAMINOPHEN 7.5; 325 MG/1; MG/1
1 TABLET ORAL EVERY 4 HOURS PRN
Status: DISCONTINUED | OUTPATIENT
Start: 2022-06-28 | End: 2022-06-28 | Stop reason: HOSPADM

## 2022-06-28 RX ORDER — MIDAZOLAM HYDROCHLORIDE 1 MG/ML
1 INJECTION INTRAMUSCULAR; INTRAVENOUS
Status: DISCONTINUED | OUTPATIENT
Start: 2022-06-28 | End: 2022-06-28 | Stop reason: HOSPADM

## 2022-06-28 RX ORDER — ONDANSETRON 2 MG/ML
4 INJECTION INTRAMUSCULAR; INTRAVENOUS ONCE AS NEEDED
Status: DISCONTINUED | OUTPATIENT
Start: 2022-06-28 | End: 2022-06-28 | Stop reason: HOSPADM

## 2022-06-28 RX ORDER — PHENAZOPYRIDINE HYDROCHLORIDE 200 MG/1
200 TABLET, FILM COATED ORAL ONCE
Status: COMPLETED | OUTPATIENT
Start: 2022-06-28 | End: 2022-06-28

## 2022-06-28 RX ORDER — CEFAZOLIN SODIUM 2 G/100ML
2 INJECTION, SOLUTION INTRAVENOUS ONCE
Status: COMPLETED | OUTPATIENT
Start: 2022-06-28 | End: 2022-06-28

## 2022-06-28 RX ORDER — BUPIVACAINE HYDROCHLORIDE AND EPINEPHRINE 5; 5 MG/ML; UG/ML
INJECTION, SOLUTION EPIDURAL; INTRACAUDAL; PERINEURAL AS NEEDED
Status: DISCONTINUED | OUTPATIENT
Start: 2022-06-28 | End: 2022-06-28 | Stop reason: HOSPADM

## 2022-06-28 RX ADMIN — FAMOTIDINE 20 MG: 10 INJECTION INTRAVENOUS at 12:34

## 2022-06-28 RX ADMIN — SCOPALAMINE 1 PATCH: 1 PATCH, EXTENDED RELEASE TRANSDERMAL at 12:33

## 2022-06-28 RX ADMIN — ONDANSETRON 4 MG: 2 INJECTION INTRAMUSCULAR; INTRAVENOUS at 15:56

## 2022-06-28 RX ADMIN — ACETAMINOPHEN 1000 MG: 500 TABLET ORAL at 12:33

## 2022-06-28 RX ADMIN — ROCURONIUM BROMIDE 50 MG: 50 INJECTION INTRAVENOUS at 14:59

## 2022-06-28 RX ADMIN — OXYCODONE HYDROCHLORIDE AND ACETAMINOPHEN 1 TABLET: 7.5; 325 TABLET ORAL at 17:25

## 2022-06-28 RX ADMIN — KETOROLAC TROMETHAMINE 30 MG: 30 INJECTION, SOLUTION INTRAMUSCULAR at 15:56

## 2022-06-28 RX ADMIN — SODIUM CHLORIDE, POTASSIUM CHLORIDE, SODIUM LACTATE AND CALCIUM CHLORIDE: 600; 310; 30; 20 INJECTION, SOLUTION INTRAVENOUS at 15:59

## 2022-06-28 RX ADMIN — SODIUM CHLORIDE, POTASSIUM CHLORIDE, SODIUM LACTATE AND CALCIUM CHLORIDE 9 ML/HR: 600; 310; 30; 20 INJECTION, SOLUTION INTRAVENOUS at 12:34

## 2022-06-28 RX ADMIN — PROPOFOL 200 MG: 10 INJECTION, EMULSION INTRAVENOUS at 14:59

## 2022-06-28 RX ADMIN — CEFAZOLIN SODIUM 2 G: 2 INJECTION, SOLUTION INTRAVENOUS at 14:45

## 2022-06-28 RX ADMIN — FENTANYL CITRATE 50 MCG: 50 INJECTION INTRAMUSCULAR; INTRAVENOUS at 14:57

## 2022-06-28 RX ADMIN — HYDROMORPHONE HYDROCHLORIDE 0.5 MG: 1 INJECTION, SOLUTION INTRAMUSCULAR; INTRAVENOUS; SUBCUTANEOUS at 17:10

## 2022-06-28 RX ADMIN — LIDOCAINE HYDROCHLORIDE 60 MG: 20 INJECTION, SOLUTION INFILTRATION; PERINEURAL at 14:59

## 2022-06-28 RX ADMIN — FENTANYL CITRATE 50 MCG: 50 INJECTION INTRAMUSCULAR; INTRAVENOUS at 16:54

## 2022-06-28 RX ADMIN — PHENAZOPYRIDINE 200 MG: 200 TABLET, FILM COATED ORAL at 12:34

## 2022-06-28 RX ADMIN — FENTANYL CITRATE 50 MCG: 50 INJECTION INTRAMUSCULAR; INTRAVENOUS at 15:16

## 2022-06-28 RX ADMIN — IBUPROFEN 600 MG: 600 TABLET, FILM COATED ORAL at 16:55

## 2022-06-28 NOTE — DISCHARGE INSTRUCTIONS
No lifting greater than 10 lbs.  No strenuous activities  Nothing in the vaginal or sexual intercourse until cleared by MD  No driving while on narcotics  No driving until off of the narcotics and no pain.   No bathtubs  Ok to shower  Go to ED or call physician Dr. Bashir [phone] with fever temperature > 100.4, severe pain, severe vaginal bleeding, severe nausea, shortness of breath or any other concerns.    Scopolamine Patch  This patch has been applied to the skin behind one of your ears.  It may stay in place up to 24 hours. You may remove it at any time after your surgery; however, it should be removed after you are up and walking around the next day.  This medicine reduces stomach upset. Side effects may include: dry mouth, dizziness, sleepiness, constipation, or upset stomach.  An allergy would show up as: a rash, itching, wheezing or shortness of breath.  Follow these instructions:  Do not drink alcohol, drive or operate machinery while taking this medicine.  Wear only 1 patch at a time. You can leave the patch on for up to 24 hours.  When you remove the patch, fold it in half with the sticky sides together and throw it away. Wash your hands and the area under the patch.  Do not touch your eye with your hand if it has touched the patch.  Wash your hands well before and after touching the patch.  Sit or stand slowly to avoid dizziness.  Call your doctor if you have:  Any sign of allergy  No relief  Trouble passing urine  Any new or severe symptoms

## 2022-06-28 NOTE — ANESTHESIA POSTPROCEDURE EVALUATION
"Patient: Sylwia Toney    Procedure Summary     Date: 06/28/22 Room / Location: [facility] OR  / [facility] MAIN OR    Anesthesia Start: 1453 Anesthesia Stop: 1623    Procedure: LAPAROSCOPY, DIAGNOSTIC LAPAROSCOPY/CHROMOTUBATION EXCISION OF ENDOMETRIOSIS (N/A Abdomen) Diagnosis:     Surgeons: Tanika Bashir MD Provider: Asaf Borderick MD    Anesthesia Type: general ASA Status: 1          Anesthesia Type: general    Vitals  Vitals Value Taken Time   /90 06/28/22 1732   Temp 36.6 °C (97.9 °F) 06/28/22 1731   Pulse 81 06/28/22 1739   Resp 14 06/28/22 1715   SpO2 99 % 06/28/22 1739   Vitals shown include unvalidated device data.        Post Anesthesia Care and Evaluation    Patient location during evaluation: bedside  Patient participation: complete - patient participated  Level of consciousness: awake and alert  Pain score: 0  Pain management: adequate    Airway patency: patent  Anesthetic complications: No anesthetic complications    Cardiovascular status: acceptable  Respiratory status: acceptable  Hydration status: acceptable    Comments: /93   Pulse 84   Temp 36.6 °C (97.9 °F) (Oral)   Resp 14   Ht 165.1 cm (65\")   Wt 86.7 kg (191 lb 2.2 oz)   LMP 06/21/2022 Comment: negative urine hcg 6/28/22  SpO2 98%   BMI 31.81 kg/m²       "

## 2022-06-28 NOTE — OP NOTE
Pre operative diagnosis   Pelvic pain   Endometriosis      Post operative diagnosis   same     Procedure:   Diagnostic laparoscopy   Resection of endometriosis   Chromopertubation   Cystoscopy      Primary Surgeon  Tanika Bashir MD    Assistants:   Fellow: Garrett Jung MD    Anes:   General     Antibiotics:   Ancef 2g IV     IVF: 1000 cc    EBL: 10 cc    Findings: The uterus, cervix, ovaries and tubes are grossly normal, there was bilateral uterosacral ligament endometriosis lesions, on chromopertubation both tubes are patent  On cystoscopy bladder intact and ureters patent bilaterally.      Specimen: Right and left uterosacral ligament lesions    Complications: none    Status: Stable to PACU    Procedure:    Patient was taken to the operating room where anesthesia was induced without difficulty.  Patient was placed in dorsal lithotomy positioning using sahne stirrups. Prepped and draped in the normal fashion. The panda was placed at the beginning of the case without any difficulty. An intrauterine catheter was placed without difficulties     Attention was then turned to the abdomen. An incision was made at the base of the umbilicus and the Veress needle was used for entry. The abdomen was insufflated to a pressure of 15 mmHg. A 5mm trocar was placed and the abdomen and pelvis were surveyed with the findings noted above. Under direct visualization a 5mm right lower quadrant and left lower quadrant were placed.     Survey of the abdomen showed the above findings     The left ureter course was tracked from the pelvis prim and it was noted to be above the left uterosacral lesion, the lesion was grasped and tented up away from the pelvic side wall then using harmonic scalpel the peritoneum was incised and the underlying cellular tissue was  from the peritoneum then with great care to the path of ureter the lesion was incised and removed and sent to pathology, in similar fashion the right lesion was removed,  surgicel snow was placed in the right lesion location    After that Methylene Blue was injected through the intrauterine catheter, dye spillage was noted from both tubes.     Cystoscopy was performed with normal findings as above.     All sponge and needle counts were correct x 2    Excellent hemostasis was noted.    The abdomen was vented and all trocars removed. Skin incisions were closed with 4-0 monocryl and hemostasis was appreciated.     Dr. Bashir was present and scrubbed for the entire procedure.    Garrett Jung MD  Fellow

## 2022-06-28 NOTE — H&P
[facility]   PREOPERATIVE HISTORY AND PHYSICAL    Patient Name:Sylwia Toney  : 1995  MRN: [id number]  Primary Care Physician: Provider, No Known  Date of admission: 2022    Subjective   Subjective     Chief Complaint: preoperative evaluation    History of Present Illness  Sylwia Toney is a 26 y.o. female who presents for preoperative evaluation. She is scheduled for LAPAROSCOPY, DIAGNOSTIC LAPAROSCOPY/CHROMOTUBATION EXCISION OF ENDOMETRIOSIS (N/A) forrest pelvic pain and suspected endometriosis     Review of Systems   Constitutional: Negative.    HENT: Negative.    Eyes: Negative.    Respiratory: Negative.  Negative for chest tightness and shortness of breath.    Cardiovascular: Negative.  Negative for chest pain and palpitations.   Gastrointestinal: Negative.  Negative for abdominal pain, nausea and vomiting.   Genitourinary: Negative.  Negative for dysuria and menstrual problem.        Personal History     History reviewed. No pertinent past medical history.    Past Surgical History:   Procedure Laterality Date   • BREAST AUGMENTATION     • WISDOM TOOTH EXTRACTION         Family History: Her family history is not on file.     Social History: She  reports that she quit smoking about 3 years ago. She has a 10.00 pack-year smoking history. She has never used smokeless tobacco. She reports current alcohol use of about 6.0 standard drinks of alcohol per week. She reports that she does not use drugs.    Home Medications:  Mefenamic Acid    Allergies:  She has No Known Allergies.    Objective    Objective     Vitals:    Temp:  [98.6 °F (37 °C)] 98.6 °F (37 °C)  Heart Rate:  [73] 73  Resp:  [16] 16  BP: (135)/(86) 135/86    Physical Exam  Constitutional:       Appearance: Normal appearance.   HENT:      Head: Normocephalic and atraumatic.   Eyes:      Pupils: Pupils are equal, round, and reactive to light.   Cardiovascular:      Rate and Rhythm: Normal rate.      Pulses: Normal pulses.   Pulmonary:       Effort: Pulmonary effort is normal. No respiratory distress.      Breath sounds: Normal breath sounds.   Abdominal:      General: There is no distension.      Palpations: Abdomen is soft.      Tenderness: There is no abdominal tenderness.   Musculoskeletal:      Cervical back: Normal range of motion.   Neurological:      Mental Status: She is alert and oriented to person, place, and time.         Assessment & Plan   Assessment / Plan     Brief Patient Summary:  Sylwia Toney is a 26 y.o. female who presents for preoperative evaluation.  Marin pelvic pain    Active Hospital Problems:  There are no active hospital problems to display for this patient.    Plan:   Procedure(s):  LAPAROSCOPY, DIAGNOSTIC LAPAROSCOPY/CHROMOTUBATION EXCISION OF ENDOMETRIOSIS    The risks, benefits, and alternatives of the procedure including but not limited to infection, bleeding, damage to other organs  and risks of the anesthesia were discussed in detail with the patient and questions were answered. No guarantees were made or implied. Informed consent was obtained.    Garrett Jung MD

## 2022-06-28 NOTE — ANESTHESIA PROCEDURE NOTES
Airway  Urgency: elective    Date/Time: 6/28/2022 3:01 PM    General Information and Staff    Patient location during procedure: OR  Anesthesiologist: Asaf Broderick MD    Indications and Patient Condition    Preoxygenated: yes      Final Airway Details  Final airway type: endotracheal airway      Successful airway: ETT  Cuffed: yes   Successful intubation technique: direct laryngoscopy  Endotracheal tube insertion site: oral  Blade: Mary  Blade size: 3  ETT size (mm): 7.0  Cormack-Lehane Classification: grade I - full view of glottis  Placement verified by: chest auscultation   Number of attempts at approach: 1

## 2022-06-28 NOTE — ANESTHESIA PREPROCEDURE EVALUATION
Anesthesia Evaluation     Patient summary reviewed and Nursing notes reviewed   no history of anesthetic complications:  NPO Solid Status: > 8 hours  NPO Liquid Status: > 2 hours           Airway   Mallampati: I  TM distance: >3 FB  Neck ROM: full  Dental - normal exam     Pulmonary - normal exam   (-) COPD, asthma, shortness of breath, sleep apnea, not a smoker  Cardiovascular - negative cardio ROS and normal exam  Exercise tolerance: good (4-7 METS)    (-) pacemaker, hypertension, valvular problems/murmurs, past MI, CAD, dysrhythmias, angina, CHF, [place], cardiac stents, PVD, DVT, hyperlipidemia      Neuro/Psych  (-) seizures, TIA, CVA, weakness, numbness, dementia  GI/Hepatic/Renal/Endo    (-)  obesity, morbid obesity, GERD, liver disease, no renal disease, diabetes, no thyroid disorder    Musculoskeletal     (-) back pain, neck pain, neck stiffness, chronic pain  Abdominal    Substance History   (-) alcohol use, drug use     OB/GYN    (-)  Pregnant        Other        (-) blood dyscrasia, arthritis, history of cancer, autoimmune disease, chronic steroid use                Anesthesia Plan    ASA 1     general     (Preop Tylenol and scopolamine ordered)  intravenous induction     Anesthetic plan, risks, benefits, and alternatives have been provided, discussed and informed consent has been obtained with: patient.        CODE STATUS:

## 2022-06-30 LAB
LAB AP CASE REPORT: NORMAL
PATH REPORT.FINAL DX SPEC: NORMAL
PATH REPORT.GROSS SPEC: NORMAL

## (undated) DEVICE — ANTIBACTERIAL UNDYED BRAIDED (POLYGLACTIN 910), SYNTHETIC ABSORBABLE SUTURE: Brand: COATED VICRYL

## (undated) DEVICE — HARMONIC ACE +7 LAPAROSCOPIC SHEARS ADVANCED HEMOSTASIS 5MM DIAMETER 36CM SHAFT LENGTH  FOR USE WITH GRAY HAND PIECE ONLY: Brand: HARMONIC ACE

## (undated) DEVICE — GLV SURG BIOGEL LTX PF 6 1/2

## (undated) DEVICE — 2, DISPOSABLE SUCTION/IRRIGATOR WITH DISPOSABLE TIP: Brand: STRYKEFLOW

## (undated) DEVICE — ENDOPATH PNEUMONEEDLE INSUFFLATION NEEDLES WITH LUER LOCK CONNECTORS 120MM: Brand: ENDOPATH

## (undated) DEVICE — ENDOPATH XCEL WITH OPTIVIEW TECHNOLOGY BLADELESS TROCARS WITH STABILITY SLEEVES: Brand: ENDOPATH XCEL OPTIVIEW

## (undated) DEVICE — MANIP UTER KRONNER

## (undated) DEVICE — SUT VIC 5/0 PS2 18IN J495H

## (undated) DEVICE — SOL NACL 0.9PCT 1000ML

## (undated) DEVICE — LAPAROSCOPIC SMOKE FILTRATION SYSTEM: Brand: PALL LAPAROSHIELD® PLUS LAPAROSCOPIC SMOKE FILTRATION SYSTEM

## (undated) DEVICE — ADHS SKIN SURG TISS VISC PREMIERPRO EXOFIN HI/VISC FAST/DRY

## (undated) DEVICE — DEV COND GAS LAP INSUFLOW W/LUER CONN

## (undated) DEVICE — ENDOCUT SCISSOR TIP, DISPOSABLE: Brand: RENEW

## (undated) DEVICE — CATH FOL SIMPLYLATEX SILELAST 16F 17IN

## (undated) DEVICE — VISUALIZATION SYSTEM: Brand: CLEARIFY

## (undated) DEVICE — SUT VIC 0/0 UR6 27IN DYED J603H

## (undated) DEVICE — 3M™ STERI-STRIP™ REINFORCED ADHESIVE SKIN CLOSURES, R1547, 1/2 IN X 4 IN (12 MM X 100 MM), 6 STRIPS/ENVELOPE: Brand: 3M™ STERI-STRIP™

## (undated) DEVICE — LOU GYN LAPAROSCOPY: Brand: MEDLINE INDUSTRIES, INC.

## (undated) DEVICE — 1016 S-DRAPE IRRIG POUCH 10/BOX: Brand: STERI-DRAPE™